# Patient Record
Sex: FEMALE | Race: BLACK OR AFRICAN AMERICAN | NOT HISPANIC OR LATINO | Employment: OTHER | URBAN - METROPOLITAN AREA
[De-identification: names, ages, dates, MRNs, and addresses within clinical notes are randomized per-mention and may not be internally consistent; named-entity substitution may affect disease eponyms.]

---

## 2017-02-08 ENCOUNTER — TELEPHONE (OUTPATIENT)
Dept: FAMILY MEDICINE | Facility: CLINIC | Age: 82
End: 2017-02-08

## 2017-02-08 NOTE — TELEPHONE ENCOUNTER
----- Message from Flaca Young sent at 2/8/2017  1:06 PM CST -----  Contact: pt daughter - arielle howell   States pt's insurance co is asking for a statement of physician and completed by pt's pcp and can be reached at [phone]//thanks/dbw

## 2017-03-20 ENCOUNTER — TELEPHONE (OUTPATIENT)
Dept: FAMILY MEDICINE | Facility: CLINIC | Age: 82
End: 2017-03-20

## 2017-03-20 NOTE — TELEPHONE ENCOUNTER
paperwork completed and faxed to patient Daughter Carrol Hodges as requested to [phone]. Original placed in envelope. Patient daughter states she will pick this up on 3/21/17.

## 2017-03-20 NOTE — TELEPHONE ENCOUNTER
----- Message from Cristela Patino sent at 3/20/2017 11:21 AM CDT -----  Contact: Carrol Carroll, Daughter  Calling to confirm her death certificate was received so she can get paperwork completed.  Please call Carrol at [phone]